# Patient Record
Sex: FEMALE | Race: WHITE | NOT HISPANIC OR LATINO | URBAN - METROPOLITAN AREA
[De-identification: names, ages, dates, MRNs, and addresses within clinical notes are randomized per-mention and may not be internally consistent; named-entity substitution may affect disease eponyms.]

---

## 2017-01-04 ENCOUNTER — GENERIC CONVERSION - ENCOUNTER (OUTPATIENT)
Dept: OTHER | Facility: OTHER | Age: 52
End: 2017-01-04

## 2017-01-04 ENCOUNTER — ALLSCRIPTS OFFICE VISIT (OUTPATIENT)
Dept: OTHER | Facility: OTHER | Age: 52
End: 2017-01-04

## 2017-01-09 LAB
. (HISTORICAL): NORMAL

## 2017-01-31 LAB — SURGICAL PATHOLOGY (HISTORICAL): NORMAL

## 2018-01-11 NOTE — PROCEDURES
Assessment    1  Endometrial polyp (621 0) (N84 0)   2  Metrorrhagia (626 6) (N92 1)    Active Problems    1  Dysfunctional uterine bleeding (626 8) (N93 8)   2  Encounter for routine gynecological examination (V72 31) (Z01 419)   3  Encounter for screening for malignant neoplasm of cervix (V76 2) (Z12 4)   4  Encounter for screening mammogram for malignant neoplasm of breast (V76 12)   (Z12 31)   5  Endometrial polyp (621 0) (N84 0)   6  Hypothyroidism (244 9) (E03 9)   7  Metrorrhagia (626 6) (N92 1)    Current Meds    1  Synthroid 75 MCG Oral Tablet (Levothyroxine Sodium); Therapy: [id number] to Recorded    Allergies    1  No Known Drug Allergies    Procedure  Preop: metrorrhagia/ endometrial polyp  Postop: same  Proc: hysteroscopy D & C polypectomy  Taken to OR placed in dorsal lithotomy with legs in stirrups  Placed under IV sedation  Perineum and vagina prepped with betadine  Exam revealed anteverted uterus normal size and contour  No adnexal masses  Anterior lip cervix grasped with single tooth tenaculum  Uterus sounded to cm  Cervix dilated with hegar dilators  Hysteroscope inserted    Small polyp posterior Lt  Medium sharp curette inserted and polyp along with endometrial curettings obtained  Tolerated procedure well        Signatures   Electronically signed by : Lan Bellamy DO; Darrin 15 2016  8:31AM EST                       (Author)

## 2018-01-12 NOTE — MISCELLANEOUS
Message  Pt called she did not get a period since March got one 7/29 not heavy but lasted 8 days was told to call Spoke to Dr Ross Gandhi wants her to have Provera 10 for 10 to make sure lining is thin  Rx called in left message for pt to call me    Pt called she does not want to take the Provera just yet she wants to monitor this bleeding to see if it happens again if it does or gets worse she will take If it continues he wants a TVS/SIS done again just had one Jan1 1 Amended By: Lord Kenny; Aug 09 2016 2:02 PM EST    Active Problems   1  Dysfunctional uterine bleeding (626 8) (N93 8)  2  Encounter for routine gynecological examination (V72 31) (Z01 419)  3  Encounter for routine gynecological examination with Papanicolaou smear of cervix   (V72 31,V76 2) (Z01 419)  4  Encounter for screening for malignant neoplasm of cervix (V76 2) (Z12 4)  5  Encounter for screening mammogram for malignant neoplasm of breast (V76 12)   (Z12 31)  6  Endometrial polyp (621 0) (N84 0)  7  Female pelvic pain (625 9) (R10 2)  8  Hypothyroidism (244 9) (E03 9)  9  Metrorrhagia (626 6) (N92 1)  10  Postoperative visit (V58 49) (Z48 89)    Current Meds  1  Synthroid 112 MCG Oral Tablet (Levothyroxine Sodium) Recorded    Allergies   1   No Known Drug Allergies    Plan  Dysfunctional uterine bleeding    · Start: MedroxyPROGESTERone Acetate 10 MG Oral Tablet; TAKE 1 TABLET DAILY    Signatures   Electronically signed by : Nikita Mckeon, ; Aug  9 2016  2:04PM EST                       (Author)

## 2018-01-16 NOTE — PROGRESS NOTES
Assessment    1  Postoperative visit (V58 49) (Z48 89)    Chief Complaint  Patient presents today for her post operative appointment  Post-Op  Post-Op Gynecologic Surgery:   Sola Ramirez is status post hysteroscopic polypectomy  HPI:   The patient reports no fatigue, no fever, no excessive pain, no excessive vaginal bleeding and no pelvic pain  PE:   Abdominal Exam: nontender  Genitourinary Exam: normal adnexa, normal cervix and normal uterus without palpable masses or tenderness  Assessment:   Post-op, the patient is doing well, has excellent pain control and is showing no signs of infection  Plan: Activity Restrictions: None  Patient instructed to follow up as needed  no vaginal discharge no vaginal odor      Active Problems    1  Dysfunctional uterine bleeding (626 8) (N93 8)   2  Encounter for routine gynecological examination (V72 31) (Z01 419)   3  Encounter for screening for malignant neoplasm of cervix (V76 2) (Z12 4)   4  Encounter for screening mammogram for malignant neoplasm of breast (V76 12)   (Z12 31)   5  Endometrial polyp (621 0) (N84 0)   6  Female pelvic pain (625 9) (R10 2)   7  Hypothyroidism (244 9) (E03 9)   8  Metrorrhagia (626 6) (N92 1)    Current Meds   1  Naproxen Sodium 550 MG Oral Tablet; TAKE 1 TABLET EVERY 12 HOURS AS NEEDED; Therapy: [id number] to (Evaluate:28Jan2016); Last Rx:18Jan2016 Ordered   2  Synthroid 75 MCG Oral Tablet; Therapy: [id number] to Recorded    Allergies    1   No Known Drug Allergies    Vitals   Recorded: [id number] 48:96VT   Systolic 374   Diastolic 76   Height 4 ft 11 5 in   Weight 192 lb    BMI Calculated 38 13   BSA Calculated 1 83   LMP 04-Oct-2015     Future Appointments    Date/Time Provider Specialty Site   04/27/2016 10:00 AM Erick Sanchez DO Obstetrics/Gynecology [facility] LONG TERM ADV GYN     Signatures   Electronically signed by : Krystal Rowe DO; Feb  3 2016 10:02AM EST                       (Author)

## 2020-10-29 ENCOUNTER — DOCTOR'S OFFICE (OUTPATIENT)
Dept: URBAN - METROPOLITAN AREA CLINIC 136 | Facility: CLINIC | Age: 55
Setting detail: OPHTHALMOLOGY
End: 2020-10-29
Payer: COMMERCIAL

## 2020-10-29 ENCOUNTER — RX ONLY (RX ONLY)
Age: 55
End: 2020-10-29

## 2020-10-29 ENCOUNTER — OPTICAL OFFICE (OUTPATIENT)
Dept: URBAN - METROPOLITAN AREA CLINIC 143 | Facility: CLINIC | Age: 55
Setting detail: OPHTHALMOLOGY
End: 2020-10-29
Payer: COMMERCIAL

## 2020-10-29 DIAGNOSIS — H52.223: ICD-10-CM

## 2020-10-29 DIAGNOSIS — H52.4: ICD-10-CM

## 2020-10-29 DIAGNOSIS — H35.363: ICD-10-CM

## 2020-10-29 PROBLEM — H04.123 DRY EYE SYNDROME LACRIMAL GLAND; BOTH EYES: Status: ACTIVE | Noted: 2020-10-29

## 2020-10-29 PROBLEM — H25.13 CATARACT NUCLEAR SCLEROSIS AGE RELATED; BOTH EYES: Status: ACTIVE | Noted: 2020-10-29

## 2020-10-29 PROCEDURE — 92015 DETERMINE REFRACTIVE STATE: CPT | Performed by: OPTOMETRIST

## 2020-10-29 PROCEDURE — V2781 PROGRESSIVE LENS PER LENS: HCPCS | Performed by: OPTOMETRIST

## 2020-10-29 PROCEDURE — 92004 COMPRE OPH EXAM NEW PT 1/>: CPT | Performed by: OPTOMETRIST

## 2020-10-29 PROCEDURE — 92134 CPTRZ OPH DX IMG PST SGM RTA: CPT | Performed by: OPTOMETRIST

## 2020-10-29 PROCEDURE — V2750 ANTI-REFLECTIVE COATING: HCPCS | Performed by: OPTOMETRIST

## 2020-10-29 PROCEDURE — V2020 VISION SVCS FRAMES PURCHASES: HCPCS | Performed by: OPTOMETRIST

## 2020-10-29 ASSESSMENT — REFRACTION_CURRENTRX
OS_AXIS: 175
OS_SPHERE: -0.25
OD_ADD: +2.00
OD_VPRISM_DIRECTION: PROGS
OD_OVR_VA: 20/
OD_AXIS: 160
OD_CYLINDER: -0.50
OS_ADD: +2.00
OS_VPRISM_DIRECTION: PROGS
OS_CYLINDER: -0.25
OS_OVR_VA: 20/
OD_SPHERE: PLANO

## 2020-10-29 ASSESSMENT — REFRACTION_MANIFEST
OD_CYLINDER: -0.50
OU_VA: 20/20
OS_AXIS: 175
OS_ADD: +2.25
OS_VA1: 20/20
OS_SPHERE: PLANO
OD_VA1: 20/20
OS_CYLINDER: -0.25
OD_ADD: +2.25
OD_SPHERE: PLANO
OD_AXIS: 160

## 2020-10-29 ASSESSMENT — TONOMETRY
OD_IOP_MMHG: 15
OS_IOP_MMHG: 13

## 2020-10-29 ASSESSMENT — REFRACTION_AUTOREFRACTION
OD_CYLINDER: -0.50
OD_AXIS: 147
OD_SPHERE: +0.25
OS_SPHERE: -0.25
OS_CYLINDER: -0.50
OS_AXIS: 028

## 2020-10-29 ASSESSMENT — VISUAL ACUITY
OS_BCVA: 20/20-3
OD_BCVA: 20/20-1

## 2020-10-29 ASSESSMENT — MACULA - DRUSEN
OD_DRUSEN: 1+ T
OS_DRUSEN: 1+ T

## 2020-10-29 ASSESSMENT — SPHEQUIV_DERIVED
OD_SPHEQUIV: 0
OS_SPHEQUIV: -0.5

## 2020-10-29 ASSESSMENT — CONFRONTATIONAL VISUAL FIELD TEST (CVF)
OS_FINDINGS: FULL
OD_FINDINGS: FULL